# Patient Record
Sex: MALE | Race: WHITE | NOT HISPANIC OR LATINO | Employment: UNEMPLOYED | URBAN - NONMETROPOLITAN AREA
[De-identification: names, ages, dates, MRNs, and addresses within clinical notes are randomized per-mention and may not be internally consistent; named-entity substitution may affect disease eponyms.]

---

## 2020-11-20 ENCOUNTER — ALLIED HEALTH/NURSE VISIT (OUTPATIENT)
Dept: FAMILY MEDICINE | Facility: OTHER | Age: 20
End: 2020-11-20
Attending: FAMILY MEDICINE
Payer: OTHER GOVERNMENT

## 2020-11-20 DIAGNOSIS — Z20.822 COVID-19 RULED OUT: Primary | ICD-10-CM

## 2020-11-20 PROCEDURE — C9803 HOPD COVID-19 SPEC COLLECT: HCPCS

## 2020-11-20 PROCEDURE — 99207 PR NO CHARGE NURSE ONLY: CPT

## 2020-11-20 PROCEDURE — U0003 INFECTIOUS AGENT DETECTION BY NUCLEIC ACID (DNA OR RNA); SEVERE ACUTE RESPIRATORY SYNDROME CORONAVIRUS 2 (SARS-COV-2) (CORONAVIRUS DISEASE [COVID-19]), AMPLIFIED PROBE TECHNIQUE, MAKING USE OF HIGH THROUGHPUT TECHNOLOGIES AS DESCRIBED BY CMS-2020-01-R: HCPCS | Mod: ZL | Performed by: FAMILY MEDICINE

## 2020-11-20 NOTE — PROGRESS NOTES
Patient swabbed for COVID-19 testing.  Sarah Neumann LPN on 11/20/2020 at 8:43 AM    [facility].

## 2020-11-21 LAB
SARS-COV-2 RNA SPEC QL NAA+PROBE: NOT DETECTED
SPECIMEN SOURCE: NORMAL

## 2023-09-13 ENCOUNTER — OFFICE VISIT (OUTPATIENT)
Dept: FAMILY MEDICINE | Facility: OTHER | Age: 23
End: 2023-09-13
Attending: FAMILY MEDICINE
Payer: COMMERCIAL

## 2023-09-13 VITALS
HEART RATE: 95 BPM | TEMPERATURE: 99.1 F | DIASTOLIC BLOOD PRESSURE: 78 MMHG | WEIGHT: 156.6 LBS | RESPIRATION RATE: 20 BRPM | OXYGEN SATURATION: 99 % | HEIGHT: 70 IN | BODY MASS INDEX: 22.42 KG/M2 | SYSTOLIC BLOOD PRESSURE: 122 MMHG

## 2023-09-13 DIAGNOSIS — G47.09 OTHER INSOMNIA: ICD-10-CM

## 2023-09-13 DIAGNOSIS — Z02.89 HEALTH EXAMINATION OF DEFINED SUBPOPULATION: ICD-10-CM

## 2023-09-13 DIAGNOSIS — F10.10 ETOH ABUSE: Primary | ICD-10-CM

## 2023-09-13 DIAGNOSIS — F15.10 METHAMPHETAMINE ABUSE (H): ICD-10-CM

## 2023-09-13 PROCEDURE — 99385 PREV VISIT NEW AGE 18-39: CPT | Performed by: FAMILY MEDICINE

## 2023-09-13 PROCEDURE — G0463 HOSPITAL OUTPT CLINIC VISIT: HCPCS

## 2023-09-13 RX ORDER — QUETIAPINE FUMARATE 25 MG/1
25 TABLET, FILM COATED ORAL
COMMUNITY
Start: 2023-09-07

## 2023-09-13 RX ORDER — NALTREXONE HYDROCHLORIDE 50 MG/1
50 TABLET, FILM COATED ORAL AT BEDTIME
COMMUNITY
Start: 2023-09-07

## 2023-09-13 ASSESSMENT — ENCOUNTER SYMPTOMS
MYALGIAS: 0
HEMATOCHEZIA: 0
CONSTIPATION: 0
HEADACHES: 0
SORE THROAT: 0
NERVOUS/ANXIOUS: 0
FEVER: 0
JOINT SWELLING: 0
PALPITATIONS: 0
DYSURIA: 0
COUGH: 0
PARESTHESIAS: 0
DIARRHEA: 0
HEMATURIA: 0
DIZZINESS: 0
CHILLS: 0
SHORTNESS OF BREATH: 0
FREQUENCY: 1
NAUSEA: 0
ARTHRALGIAS: 0
WEAKNESS: 0
EYE PAIN: 0
HEARTBURN: 0
ABDOMINAL PAIN: 0

## 2023-09-13 ASSESSMENT — PAIN SCALES - GENERAL: PAINLEVEL: NO PAIN (0)

## 2023-09-13 NOTE — PROGRESS NOTES
SUBJECTIVE:   Zafar Peters is a 23 year old male who presents to clinic today for the following health issues:  [facility]  Physical   Patient arrives here for physical.  This is his fourth treatment for alcohol abuse.  He reports his court ordered.  He also has been using meth in the past.  Patient indicates a violation of probation.  Currently does not offer any specific concerns or complaints.  His alcohol of choice was vodka.  Patient denies any IV drugs or sharing needles.  Declines hepatitis C and HIV.    Healthy Habits:     Getting at least 3 servings of Calcium per day:  Yes    Bi-annual eye exam:  NO    Dental care twice a year:  NO    Sleep apnea or symptoms of sleep apnea:  Daytime drowsiness    Diet:  Regular (no restrictions)    Frequency of exercise:  2-3 days/week    Duration of exercise:  15-30 minutes    Taking medications regularly:  Yes    Medication side effects:  None    Additional concerns today:  No        Patient Active Problem List    Diagnosis Date Noted    ETOH abuse 09/13/2023     Priority: Medium    Methamphetamine abuse (H) 09/13/2023     Priority: Medium    Other insomnia 09/13/2023     Priority: Medium       Review of Systems   Constitutional:  Negative for chills and fever.   HENT:  Negative for congestion, ear pain, hearing loss and sore throat.    Eyes:  Negative for pain and visual disturbance.   Respiratory:  Negative for cough and shortness of breath.    Cardiovascular:  Negative for chest pain, palpitations and peripheral edema.   Gastrointestinal:  Negative for abdominal pain, constipation, diarrhea, heartburn, hematochezia and nausea.   Genitourinary:  Positive for frequency. Negative for dysuria, genital sores, hematuria, impotence, penile discharge and urgency.   Musculoskeletal:  Negative for arthralgias, joint swelling and myalgias.   Skin:  Negative for rash.   Neurological:  Negative for dizziness, weakness, headaches and paresthesias.   Psychiatric/Behavioral:   "Negative for mood changes. The patient is not nervous/anxious.         OBJECTIVE:     /78   Pulse 95   Temp 99.1  F (37.3  C)   Resp 20   Ht 1.778 m (5' 10\")   Wt 71 kg (156 lb 9.6 oz)   SpO2 99%   BMI 22.47 kg/m    Body mass index is 22.47 kg/m .  Physical Exam  Constitutional:       Appearance: Normal appearance.   HENT:      Right Ear: Tympanic membrane normal.      Left Ear: Tympanic membrane normal.      Mouth/Throat:      Mouth: Mucous membranes are moist.   Cardiovascular:      Rate and Rhythm: Normal rate and regular rhythm.   Abdominal:      General: Abdomen is flat.   Musculoskeletal:         General: Normal range of motion.   Skin:     General: Skin is warm.   Neurological:      Mental Status: He is alert.   Psychiatric:         Mood and Affect: Mood normal.         Thought Content: Thought content normal.         Diagnostic Test Results:  none     ASSESSMENT/PLAN:           ICD-10-CM    1. ETOH abuse  F10.10       2. Health examination of defined subpopulation  Z02.89       3. Methamphetamine abuse (H)  F15.10       4. Other insomnia  G47.09           Satisfactory physical.  Cleared to undergo treatment.    Declines immunizations up-to-date.    Zane Ziegler MD  [facility]  "